# Patient Record
(demographics unavailable — no encounter records)

---

## 2025-03-18 NOTE — PROCEDURE
[Endometrial Biopsy] : Endometrial biopsy [Consent Obtained] : Consent obtained [Pre-op Evaluation] : Pre-op evaluation [Irregular Bleeding] : irregular uterine bleeding [Risks] : risks [Benefits] : benefits [Alternatives] : alternatives [Infection] : infection [Bleeding] : bleeding [Allergic Reaction] : allergic reaction [Uterine Perforation] : uterine perforation [Pain] : pain [Negative] : negative pregnancy test [No Premedication] : No premedication [None] : none [Easy Passage] : Easy passage [Sounded to ___ cm] : sounded to [unfilled] ~Ucm [Moderate] : moderate [Specimen Collected] : collected [Sent to Pathology] : placed in buffered formalin and sent for pathology [Tolerated Well] : Patient tolerated the procedure well [No Complications] : No complications [LMPDate] : 2/20/25 [de-identified] : allis [TextEntry] :    - Chief Complaint (CC): Complaints about heavy bleeding for couple of days following by irregularities in menstrual cycle, severe pain likely due to a fibroid and an abnormal black discharge two weeks after her period.   - History of Present Illness: The patient has been experiencing abnormal and excessive bleeding for years. The patient also reported a chunk of black gunk coming out of her two weeks later of her menstruation cycle. The severe discomfort is mainly located in her lower right abdomen, patient also reported a visible knot formation. In addition to that, she also notices swelling on her right side during periods. The patient has a history of  delivery x2.

## 2025-03-18 NOTE — ASSESSMENT
[FreeTextEntry1] : - Diagnostic Results : Ultrasonography revealed a divot or niche in the  scar creating a defect where blood collects and results in the black discharge seen post menstruation. Additionally, a fibroid of 3 cm in diameter was found.   Assessment and Plan: AUB: Abnormal and excessive bleeding diagnosed as a result of perimenopause and the presence of a fibroid. Pt is not a candidate for ablation given uterine defect with niche at CS scar, patient would like definitive tx. Plan : - Therapeutic Interventions : Considering the patient's discomfort, a total hysterectomy preserving the ovaries is recommended. Pharmaceutical interventions were not desired by the patient. - Diagnostic Tests : EMB done. The results are pending - Follow-Up : The patient will be followed up for the biopsy result and to communicate the doctor's choice for her surgery. Will try to do it at [facility] with assist.   Elle Pritchard MD Obstetrics and Gynecology

## 2025-05-19 NOTE — ASSESSMENT
[Doing Well] : is doing well [Excellent Pain Control] : has excellent pain control [No Sign of Infection] : is showing no signs of infection [de-identified] : doing well  continue Postop care  f/u 4 weeks

## 2025-05-19 NOTE — HISTORY OF PRESENT ILLNESS
[Pain is well-controlled] : pain is well-controlled [Clean/Dry/Intact] : clean, dry and intact [Swelling] : not swollen [Dehiscence] : not dehisced [Healed] : healed [Discharge] : absent of discharge [None] : no vaginal bleeding [Normal] : normal [de-identified] : 14 [de-identified] : Total laparoscopic hysterectomy, bilateral salpingetomy [de-identified] : kelli [de-identified] : Pt reports doing well, improved ecchymoses, at mons pubic and labia resolved, right leg improving per patient